# Patient Record
(demographics unavailable — no encounter records)

---

## 2024-12-04 NOTE — HISTORY OF PRESENT ILLNESS
[Full time] : Work status: full time [de-identified] : 12/04/2024: 61 y/o male presents with left knee pain for 2-3 months. No specific injury. Describes anterior knee pain that is worse with bending/squatting. Difficulty rising from a seated position. Occasional buckling. He has been icing and taking NSAIDs with little relief. History of meniscus tear (states MRI was roughly 8 years ago) that was treated conservatively. [] : Post Surgical Visit: no [FreeTextEntry5] : left knee pain for 2-3 months constant pain  [de-identified] :

## 2024-12-04 NOTE — ASSESSMENT
[FreeTextEntry1] : 12/04/2024: L knee x-rays, 4 views, reveal negative Underlying pathology reviewed and treatment options discussed. Obtain MRI L knee R/O MMT Start PT and HEP to improve mechanics and reduce pain. Activity modification as tolerated. Prescribed mobic. Questions addressed. Follow up after MRI.   The documentation recorded by the scribe accurately reflects the service I personally performed and the decisions made by me. I, Merna Lozano, attest that this documentation has been prepared under the direction and in the presence of Provider Sudhir Moise MD.   The patient was seen by Sudhir Moise MD.

## 2024-12-04 NOTE — PHYSICAL EXAM
[Left] : left knee [NL (0)] : extension 0 degrees [5___] : hamstring 5[unfilled]/5 [] : no erythema [FreeTextEntry8] : mild medial joint line tenderness [TWNoteComboBox7] : flexion 130 degrees